# Patient Record
Sex: FEMALE | Race: WHITE | NOT HISPANIC OR LATINO | Employment: FULL TIME | URBAN - METROPOLITAN AREA
[De-identification: names, ages, dates, MRNs, and addresses within clinical notes are randomized per-mention and may not be internally consistent; named-entity substitution may affect disease eponyms.]

---

## 2019-07-20 ENCOUNTER — TRANSCRIBE ORDERS (OUTPATIENT)
Dept: ADMINISTRATIVE | Facility: HOSPITAL | Age: 79
End: 2019-07-20

## 2019-07-20 ENCOUNTER — LAB (OUTPATIENT)
Dept: LAB | Facility: HOSPITAL | Age: 79
End: 2019-07-20

## 2019-07-20 DIAGNOSIS — E03.9 MYXEDEMA HEART DISEASE: Primary | ICD-10-CM

## 2019-07-20 DIAGNOSIS — I51.9 MYXEDEMA HEART DISEASE: Primary | ICD-10-CM

## 2019-07-20 DIAGNOSIS — I51.9 MYXEDEMA HEART DISEASE: ICD-10-CM

## 2019-07-20 DIAGNOSIS — E03.9 MYXEDEMA HEART DISEASE: ICD-10-CM

## 2019-07-20 LAB
T4 FREE SERPL-MCNC: 1.21 NG/DL (ref 0.58–1.64)
TSH SERPL DL<=0.05 MIU/L-ACNC: 2.65 MIU/ML (ref 0.34–5.6)

## 2019-07-20 PROCEDURE — 36415 COLL VENOUS BLD VENIPUNCTURE: CPT

## 2019-07-20 PROCEDURE — 84439 ASSAY OF FREE THYROXINE: CPT

## 2019-07-20 PROCEDURE — 84443 ASSAY THYROID STIM HORMONE: CPT

## 2020-01-11 ENCOUNTER — LAB (OUTPATIENT)
Dept: LAB | Facility: HOSPITAL | Age: 80
End: 2020-01-11

## 2020-01-11 ENCOUNTER — TRANSCRIBE ORDERS (OUTPATIENT)
Dept: ADMINISTRATIVE | Facility: HOSPITAL | Age: 80
End: 2020-01-11

## 2020-01-11 DIAGNOSIS — R73.09 IMPAIRED GLUCOSE TOLERANCE TEST: ICD-10-CM

## 2020-01-11 DIAGNOSIS — R74.8 ACID PHOSPHATASE ELEVATED: ICD-10-CM

## 2020-01-11 DIAGNOSIS — E03.9 MYXEDEMA HEART DISEASE: ICD-10-CM

## 2020-01-11 DIAGNOSIS — I51.9 MYXEDEMA HEART DISEASE: ICD-10-CM

## 2020-01-11 DIAGNOSIS — I10 ESSENTIAL HYPERTENSION, MALIGNANT: ICD-10-CM

## 2020-01-11 DIAGNOSIS — E78.5 HYPERLIPIDEMIA, UNSPECIFIED HYPERLIPIDEMIA TYPE: Primary | ICD-10-CM

## 2020-01-11 DIAGNOSIS — E78.5 HYPERLIPIDEMIA, UNSPECIFIED HYPERLIPIDEMIA TYPE: ICD-10-CM

## 2020-01-11 LAB
ALBUMIN SERPL-MCNC: 3.8 G/DL (ref 3.5–5.2)
ALBUMIN/GLOB SERPL: 1.5 G/DL
ALP SERPL-CCNC: 94 U/L (ref 39–117)
ALT SERPL W P-5'-P-CCNC: 17 U/L (ref 1–33)
ANION GAP SERPL CALCULATED.3IONS-SCNC: 13.4 MMOL/L (ref 5–15)
AST SERPL-CCNC: 24 U/L (ref 1–32)
BILIRUB SERPL-MCNC: 1.3 MG/DL (ref 0.2–1.2)
BUN BLD-MCNC: 19 MG/DL (ref 8–23)
BUN/CREAT SERPL: 18.3 (ref 7–25)
CALCIUM SPEC-SCNC: 9.2 MG/DL (ref 8.6–10.5)
CHLORIDE SERPL-SCNC: 100 MMOL/L (ref 98–107)
CHOLEST SERPL-MCNC: 173 MG/DL (ref 0–200)
CO2 SERPL-SCNC: 24.6 MMOL/L (ref 22–29)
CREAT BLD-MCNC: 1.04 MG/DL (ref 0.57–1)
CRP SERPL-MCNC: 0.05 MG/DL (ref 0–0.5)
GFR SERPL CREATININE-BSD FRML MDRD: 51 ML/MIN/1.73
GLOBULIN UR ELPH-MCNC: 2.6 GM/DL
GLUCOSE BLD-MCNC: 88 MG/DL (ref 65–99)
HDLC SERPL-MCNC: 52 MG/DL (ref 40–60)
LDLC SERPL CALC-MCNC: 110 MG/DL (ref 0–100)
LDLC/HDLC SERPL: 2.12 {RATIO}
POTASSIUM BLD-SCNC: 4.3 MMOL/L (ref 3.5–5.2)
PROT SERPL-MCNC: 6.4 G/DL (ref 6–8.5)
SODIUM BLD-SCNC: 138 MMOL/L (ref 136–145)
TRIGL SERPL-MCNC: 55 MG/DL (ref 0–150)
TSH SERPL DL<=0.05 MIU/L-ACNC: 3.31 UIU/ML (ref 0.27–4.2)
VLDLC SERPL-MCNC: 11 MG/DL (ref 5–40)

## 2020-01-11 PROCEDURE — 80061 LIPID PANEL: CPT

## 2020-01-11 PROCEDURE — 36415 COLL VENOUS BLD VENIPUNCTURE: CPT

## 2020-01-11 PROCEDURE — 84443 ASSAY THYROID STIM HORMONE: CPT

## 2020-01-11 PROCEDURE — 86140 C-REACTIVE PROTEIN: CPT

## 2020-01-11 PROCEDURE — 83036 HEMOGLOBIN GLYCOSYLATED A1C: CPT

## 2020-01-11 PROCEDURE — 80053 COMPREHEN METABOLIC PANEL: CPT

## 2020-01-13 LAB — HBA1C MFR BLD: 5.1 % (ref 3.5–5.6)

## 2020-07-11 ENCOUNTER — LAB (OUTPATIENT)
Dept: LAB | Facility: HOSPITAL | Age: 80
End: 2020-07-11

## 2020-07-11 ENCOUNTER — TRANSCRIBE ORDERS (OUTPATIENT)
Dept: LAB | Facility: HOSPITAL | Age: 80
End: 2020-07-11

## 2020-07-11 DIAGNOSIS — I10 ESSENTIAL HYPERTENSION, MALIGNANT: ICD-10-CM

## 2020-07-11 DIAGNOSIS — E78.5 HYPERLIPIDEMIA, UNSPECIFIED HYPERLIPIDEMIA TYPE: ICD-10-CM

## 2020-07-11 DIAGNOSIS — E03.5 HYPOTHYROID COMA (HCC): ICD-10-CM

## 2020-07-11 DIAGNOSIS — E78.5 HYPERLIPIDEMIA, UNSPECIFIED HYPERLIPIDEMIA TYPE: Primary | ICD-10-CM

## 2020-07-11 LAB
ALBUMIN SERPL-MCNC: 3.8 G/DL (ref 3.5–5.2)
ALBUMIN/GLOB SERPL: 1.7 G/DL
ALP SERPL-CCNC: 84 U/L (ref 39–117)
ALT SERPL W P-5'-P-CCNC: 15 U/L (ref 1–33)
ANION GAP SERPL CALCULATED.3IONS-SCNC: 10.1 MMOL/L (ref 5–15)
AST SERPL-CCNC: 20 U/L (ref 1–32)
BILIRUB SERPL-MCNC: 1.8 MG/DL (ref 0–1.2)
BUN SERPL-MCNC: 15 MG/DL (ref 8–23)
BUN/CREAT SERPL: 13.5 (ref 7–25)
CALCIUM SPEC-SCNC: 9.9 MG/DL (ref 8.6–10.5)
CHLORIDE SERPL-SCNC: 102 MMOL/L (ref 98–107)
CHOLEST SERPL-MCNC: 164 MG/DL (ref 0–200)
CO2 SERPL-SCNC: 26.9 MMOL/L (ref 22–29)
CREAT SERPL-MCNC: 1.11 MG/DL (ref 0.57–1)
GFR SERPL CREATININE-BSD FRML MDRD: 47 ML/MIN/1.73
GLOBULIN UR ELPH-MCNC: 2.3 GM/DL
GLUCOSE SERPL-MCNC: 90 MG/DL (ref 65–99)
HDLC SERPL-MCNC: 56 MG/DL (ref 40–60)
LDLC SERPL CALC-MCNC: 99 MG/DL (ref 0–100)
LDLC/HDLC SERPL: 1.76 {RATIO}
POTASSIUM SERPL-SCNC: 4.4 MMOL/L (ref 3.5–5.2)
PROT SERPL-MCNC: 6.1 G/DL (ref 6–8.5)
SODIUM SERPL-SCNC: 139 MMOL/L (ref 136–145)
TRIGL SERPL-MCNC: 46 MG/DL (ref 0–150)
TSH SERPL DL<=0.05 MIU/L-ACNC: 4.83 UIU/ML (ref 0.27–4.2)
VLDLC SERPL-MCNC: 9.2 MG/DL (ref 5–40)

## 2020-07-11 PROCEDURE — 80053 COMPREHEN METABOLIC PANEL: CPT

## 2020-07-11 PROCEDURE — 80061 LIPID PANEL: CPT

## 2020-07-11 PROCEDURE — 36415 COLL VENOUS BLD VENIPUNCTURE: CPT

## 2020-07-11 PROCEDURE — 84443 ASSAY THYROID STIM HORMONE: CPT

## 2020-11-22 PROBLEM — I10 HYPERTENSION: Status: ACTIVE | Noted: 2018-10-28

## 2020-11-22 PROBLEM — E78.5 HYPERLIPIDEMIA: Status: ACTIVE | Noted: 2018-10-28

## 2020-11-22 PROBLEM — E03.9 HYPOTHYROIDISM: Status: ACTIVE | Noted: 2018-10-28

## 2020-11-22 PROBLEM — M26.69 TMJ DERANGEMENT: Status: ACTIVE | Noted: 2018-10-28

## 2020-12-07 ENCOUNTER — TRANSCRIBE ORDERS (OUTPATIENT)
Dept: ADMINISTRATIVE | Facility: HOSPITAL | Age: 80
End: 2020-12-07

## 2020-12-07 ENCOUNTER — LAB (OUTPATIENT)
Dept: LAB | Facility: HOSPITAL | Age: 80
End: 2020-12-07

## 2020-12-07 DIAGNOSIS — R53.83 TIREDNESS: Primary | ICD-10-CM

## 2020-12-07 DIAGNOSIS — R53.83 TIREDNESS: ICD-10-CM

## 2021-07-03 ENCOUNTER — TRANSCRIBE ORDERS (OUTPATIENT)
Dept: ADMINISTRATIVE | Facility: HOSPITAL | Age: 81
End: 2021-07-03

## 2021-07-03 ENCOUNTER — LAB (OUTPATIENT)
Dept: LAB | Facility: HOSPITAL | Age: 81
End: 2021-07-03

## 2021-07-03 DIAGNOSIS — R73.09 IMPAIRED GLUCOSE TOLERANCE TEST: ICD-10-CM

## 2021-07-03 DIAGNOSIS — I10 HYPERTENSION, UNSPECIFIED TYPE: ICD-10-CM

## 2021-07-03 DIAGNOSIS — E03.9 HYPOTHYROIDISM, ADULT: ICD-10-CM

## 2021-07-03 DIAGNOSIS — R53.83 TIREDNESS: ICD-10-CM

## 2021-07-03 DIAGNOSIS — R74.8 ACID PHOSPHATASE ELEVATED: ICD-10-CM

## 2021-07-03 DIAGNOSIS — R74.8 ACID PHOSPHATASE ELEVATED: Primary | ICD-10-CM

## 2021-07-03 DIAGNOSIS — E78.2 MIXED HYPERLIPIDEMIA: ICD-10-CM

## 2021-07-03 LAB
ALBUMIN SERPL-MCNC: 3.8 G/DL (ref 3.5–5.2)
ALBUMIN/GLOB SERPL: 1.6 G/DL
ALP SERPL-CCNC: 87 U/L (ref 39–117)
ALT SERPL W P-5'-P-CCNC: 15 U/L (ref 1–33)
ANION GAP SERPL CALCULATED.3IONS-SCNC: 9.2 MMOL/L (ref 5–15)
AST SERPL-CCNC: 22 U/L (ref 1–32)
BILIRUB SERPL-MCNC: 1.8 MG/DL (ref 0–1.2)
BUN SERPL-MCNC: 14 MG/DL (ref 8–23)
BUN/CREAT SERPL: 12.7 (ref 7–25)
CALCIUM SPEC-SCNC: 9.7 MG/DL (ref 8.6–10.5)
CHLORIDE SERPL-SCNC: 105 MMOL/L (ref 98–107)
CHOLEST SERPL-MCNC: 166 MG/DL (ref 0–200)
CO2 SERPL-SCNC: 27.8 MMOL/L (ref 22–29)
CREAT SERPL-MCNC: 1.1 MG/DL (ref 0.57–1)
CRP SERPL-MCNC: <0.3 MG/DL (ref 0–0.5)
GFR SERPL CREATININE-BSD FRML MDRD: 48 ML/MIN/1.73
GLOBULIN UR ELPH-MCNC: 2.4 GM/DL
GLUCOSE SERPL-MCNC: 86 MG/DL (ref 65–99)
HDLC SERPL-MCNC: 56 MG/DL (ref 40–60)
LDLC SERPL CALC-MCNC: 100 MG/DL (ref 0–100)
LDLC/HDLC SERPL: 1.79 {RATIO}
POTASSIUM SERPL-SCNC: 4.7 MMOL/L (ref 3.5–5.2)
PROT SERPL-MCNC: 6.2 G/DL (ref 6–8.5)
SODIUM SERPL-SCNC: 142 MMOL/L (ref 136–145)
TRIGL SERPL-MCNC: 49 MG/DL (ref 0–150)
TSH SERPL DL<=0.05 MIU/L-ACNC: 4.63 UIU/ML (ref 0.27–4.2)
VLDLC SERPL-MCNC: 10 MG/DL (ref 5–40)

## 2021-07-03 PROCEDURE — 86140 C-REACTIVE PROTEIN: CPT

## 2021-07-03 PROCEDURE — 80061 LIPID PANEL: CPT

## 2021-07-03 PROCEDURE — 36415 COLL VENOUS BLD VENIPUNCTURE: CPT

## 2021-07-03 PROCEDURE — 80053 COMPREHEN METABOLIC PANEL: CPT

## 2021-07-03 PROCEDURE — 83036 HEMOGLOBIN GLYCOSYLATED A1C: CPT

## 2021-07-03 PROCEDURE — 84443 ASSAY THYROID STIM HORMONE: CPT

## 2021-07-04 LAB — HBA1C MFR BLD: 5.4 % (ref 3.5–5.6)

## 2021-10-12 ENCOUNTER — LAB (OUTPATIENT)
Dept: LAB | Facility: HOSPITAL | Age: 81
End: 2021-10-12

## 2021-10-12 ENCOUNTER — TRANSCRIBE ORDERS (OUTPATIENT)
Dept: ADMINISTRATIVE | Facility: HOSPITAL | Age: 81
End: 2021-10-12

## 2021-10-12 DIAGNOSIS — E03.9 THYROID GLAND INSUFFICIENCY (ACQUIRED): Primary | ICD-10-CM

## 2021-10-12 DIAGNOSIS — E03.9 THYROID GLAND INSUFFICIENCY (ACQUIRED): ICD-10-CM

## 2021-10-12 PROCEDURE — 84443 ASSAY THYROID STIM HORMONE: CPT

## 2021-10-12 PROCEDURE — 36415 COLL VENOUS BLD VENIPUNCTURE: CPT

## 2021-10-13 LAB — TSH SERPL DL<=0.05 MIU/L-ACNC: 1.88 UIU/ML (ref 0.27–4.2)

## 2022-11-19 ENCOUNTER — APPOINTMENT (OUTPATIENT)
Dept: GENERAL RADIOLOGY | Facility: HOSPITAL | Age: 82
End: 2022-11-19

## 2022-11-19 ENCOUNTER — HOSPITAL ENCOUNTER (EMERGENCY)
Facility: HOSPITAL | Age: 82
Discharge: HOME | End: 2022-11-19
Attending: EMERGENCY MEDICINE | Admitting: EMERGENCY MEDICINE

## 2022-11-19 VITALS
TEMPERATURE: 98 F | HEIGHT: 65 IN | BODY MASS INDEX: 24.65 KG/M2 | OXYGEN SATURATION: 100 % | WEIGHT: 147.93 LBS | SYSTOLIC BLOOD PRESSURE: 145 MMHG | HEART RATE: 74 BPM | DIASTOLIC BLOOD PRESSURE: 67 MMHG | RESPIRATION RATE: 17 BRPM

## 2022-11-19 DIAGNOSIS — S52.592A OTHER CLOSED FRACTURE OF DISTAL END OF LEFT RADIUS, INITIAL ENCOUNTER: Primary | ICD-10-CM

## 2022-11-19 PROCEDURE — 73110 X-RAY EXAM OF WRIST: CPT

## 2022-11-19 PROCEDURE — 99282 EMERGENCY DEPT VISIT SF MDM: CPT

## 2022-11-19 NOTE — ED PROVIDER NOTES
"Subjective        Provider in Triage Note  81 y/o white female with PMHx of thyroid disease, HLD, and HTN presents to the ED with L wrist pain onset 3 days ago. Pt states she was checking the anti-freeze level in her car when she lost her  of the castañeda and it came down onto her L wrist. Pt reports pain/discomfort with moving of the wrist. Notes a pulling pain in L elbow and thumb/finger stiffness with movement. Pt denies taking anything for her pain but reports some temporary relief with ice. Pain is an 8 out of 10. Denies hitting her head, LOC, falling, or R finger/wrist/elbow pain at this time.       History of Present Illness    Review of Systems   Constitutional: Negative.    Skin: Negative for wound.   Neurological: Negative.        Past Medical History:   Diagnosis Date   • Disease of thyroid gland    • Hyperlipidemia    • Hypertension        No Known Allergies    No past surgical history on file.    No family history on file.    Social History     Socioeconomic History   • Marital status:    Tobacco Use   • Smoking status: Never   • Smokeless tobacco: Never   Substance and Sexual Activity   • Alcohol use: Never       Prior to Admission medications    Medication Sig Start Date End Date Taking? Authorizing Provider   atorvastatin (LIPITOR) 40 MG tablet ATORVASTATIN CALCIUM 40 MG TABS 10/28/18   Emergency, Nurse Valeria, RN   chlorthalidone (HYGROTON) 25 MG tablet  6/19/21   Emergency, Nurse Valeria, RN   levothyroxine (Synthroid) 50 MCG tablet SYNTHROID 50 MCG TABS 10/28/18   Emergency, Nurse Epic, RN   olmesartan (BENICAR) 20 MG tablet  5/17/21   Emergency, Nurse Epic, RN   olmesartan-hydrochlorothiazide (Benicar HCT) 40-25 MG per tablet BENICAR HCT 40-25 MG TABS 10/28/18   Emergency, Nurse Valeria, RN   traMADol (ULTRAM) 50 MG tablet 1 tablet p.o. every 6 hours 7/13/21   Bharat Browning MD     /66   Pulse 75   Temp 97.9 °F (36.6 °C) (Oral)   Resp 18   Ht 165.1 cm (65\")   Wt 67.1 kg (147 lb " 14.9 oz)   SpO2 100%   BMI 24.62 kg/m²   I examined the patient using the appropriate personal protective equipment.        Objective   Physical Exam  General: Well-appearing, no acute distress  Psych: Oriented, pleasant affect  Respirations: Clear, nonlabored respirations  Skin: No rash, normal color  Left upper extremity there is some mild soft tissue swelling over the volar aspect of the wrist, there is no snuffbox tenderness there is no bony deformity she has normal pulses at the wrist normal brisk cap refill distally normal sensorimotor function throughout the hand, there is no proximal forearm tenderness, no open wounds, no cellulitis  Procedures           ED Course               XR Wrist 3+ View Left    Result Date: 11/19/2022  Possible nondisplaced fracture along distal radius.  Electronically Signed By-Gordon Israel MD On:11/19/2022 11:00 AM This report was finalized on [id number] by  Gordon Israel MD.                                 MDM  Patient was advised the findings with possible fracture of the distal radius.  No apparent snuffbox tenderness to suggest scaphoid injury.  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  She was placed in wrist splint by the ER tech and will be discharged home to follow-up with orthopedic referral.  Patient voiced understanding plan and discharged in good condition.  She does have normal neurovascular function of the extremity intact and no other signs of injury  Final diagnoses:   Other closed fracture of distal end of left radius, initial encounter       ED Disposition  ED Disposition     ED Disposition   Discharge    Condition   Stable    Comment   --             Tony Gamboa MD  [address] [place] [address]  [phone]    Schedule an appointment as soon as possible for a visit in 2 days           Medication List      No changes were made to your prescriptions during this visit.          Solitario Faulkner,  MD  11/19/22 9164

## 2022-11-19 NOTE — DISCHARGE INSTRUCTIONS
Cool compress, elevation, splint.  Tylenol for discomfort.  Follow-up with orthopedics this week.  Return for increased pain, discoloration, numbness, weakness or any other concerns

## 2023-07-03 ENCOUNTER — TELEPHONE (OUTPATIENT)
Dept: CARDIOLOGY | Facility: CLINIC | Age: 83
End: 2023-07-03

## 2023-07-03 NOTE — TELEPHONE ENCOUNTER
Caller: Ines Browning    Relationship to patient: Self    Best call back number: [phone]     Type of visit: NEW PATIENT    If rescheduling, when is the original appointment: 09.01.2023    Additional notes: PT STATES THAT SHE STILL WORKS AND SHE TENDS TO WORK MOST AT END OF MONTH/FIRST OF MONTH AND WAS HOPING TO BE SEEN A LITTLE SOONER - PT ON [facility] AND SCHEDULED IN NEXT AVAILABLE SPOT WITH DR HURST PER PT REQUEST -

## 2023-08-24 ENCOUNTER — TELEPHONE (OUTPATIENT)
Dept: CARDIOLOGY | Facility: CLINIC | Age: 83
End: 2023-08-24

## 2023-08-24 NOTE — TELEPHONE ENCOUNTER
[facility] received a fax that requires your attention. The document has been indexed to the patient’s chart for your review.      Reason for sending: EXTERNAL MEDICAL RECORD NOTIFICATION     Documents Description: [employer] SHARING MEDICAL RECORDS, RECORDS INDEXED LISTED BELOW    Name of Sender: [employer] [facility]     Date Indexed: MULTIPLE, LIST BELOW     NOTES:   INDEXED THE FOLLOWING MEDICAL RECORDS:  PROGRESS NOTE 2.23.23  ECHOCARDIOGRAM 7.13.22  ECHOCARDIOGRAM 1.5.22  ECHO 7.7.21  MEDICATIONS 8.24.23  LAB 10.18.22  ECHOCARDIOGRAM 5.8.19  ECG (POSSIBLE ABNORMAL) 7.21.20  ECG (POSSIBLE ABNORMAL) 2.23.23

## 2023-10-09 NOTE — PROGRESS NOTES
Cardiology Office Consultation      Encounter Date:  10/11/2023    PATIENT IDENTIFICATION    Name: Ines Browning  Age: 83 y.o. Sex: female : 1940  MRN: [id number]    Reason For Consultation:  Valvular heart disease    History of Present Illness:  Dear Dr. Cao, Dulce Maria Yeung, JADE    I had the pleasure of seeing Ines Browning today. As you are well aware, this is a 83 y.o. female with no established history of ischemic heart disease.  She does have a history of hypertension, hyperlipidemia, antiplatelet therapy, hypothyroidism, and valvular heart disease.  She presents today for the evaluation of valvular heart disease.    She denies any chest pain pressure heaviness or tightness.  She denies any shortness of breath.  She denies any PND orthopnea.  She denies any syncope or near syncope.  She reports feeling quite well from a cardiac perspective.    She is a very active individual.  She still works 40 hours a week for [employer].  She still mows her own grass utilizing a riding lawn more and self-propelled mower at times.  She espouses no physical limitations.    She expresses some concern regarding her echocardiogram.  She had an echocardiogram performed in 2022.  This demonstrated moderate pulmonary hypertension, normal left ventricular systolic function, mild tricuspid and pulmonic insufficiencies, moderate mitral insufficiency and moderate to severe aortic insufficiency.    We had an extensive discussion regarding valvular heart disease, natural history, symptoms of significant valve failure, and modalities for therapy.  Currently, the patient is completely asymptomatic and I would recommend no interventional strategies at this time.  Routine surveillance with an echocardiogram every 3 to 5 years is warranted unless symptoms dictate otherwise.    Her blood pressure is elevated today but she keeps a very extensive blood pressure logs.  She logs her pressure twice daily.  Her  "blood pressure is almost exclusively below 140/90.    Assessment & Plan    Impressions:  Valvular heart disease     Moderate to severe aortic insufficiency, moderate mitral insufficiency, mild tricuspid/pulmonic insufficiency echocardiogram July 2022  Moderate pulmonary hypertension  Hypertension  Hypertensive cardiovascular disease  Mixed hyperlipidemia  Antiplatelet therapy    Recommendations:  Continuation of her current cardiovascular regimen at the present time.     This includes antihypertensives, antiplatelets, and statin therapy.  Periodic echocardiogram to evaluate insufficiency of valves.  Follow-up in 1 years time sooner should there be difficulties.      Diagnoses and all orders for this visit:    1. Nonrheumatic aortic valve insufficiency (Primary)  -     ECG 12 Lead    2. Nonrheumatic mitral valve regurgitation  -     ECG 12 Lead    3. Nonrheumatic tricuspid valve regurgitation  -     ECG 12 Lead    4. Nonrheumatic pulmonary valve insufficiency  -     ECG 12 Lead    5. Essential hypertension  -     ECG 12 Lead    6. Mixed hyperlipidemia  -     ECG 12 Lead    7. Long term (current) use of antithrombotics/antiplatelets  -     ECG 12 Lead         Objective:    Vitals:  Vitals:    10/11/23 1441   BP: 166/84   Pulse: 67   SpO2: 98%   Weight: 67.1 kg (148 lb)   Height: 165.1 cm (65\")     Body mass index is 24.63 kg/mý.    Physical Exam:    General: Alert, cooperative, no distress, appears stated age  Head:  Normocephalic, atraumatic, mucous membranes moist  Eyes:  Conjunctiva/corneas clear, EOM's intact     Neck:  Supple,  no bruit    Lungs: Clear to auscultation bilaterally, no wheezes rhonchi rales are noted  Chest wall: No tenderness  Heart::  Regular rate and rhythm, S1 and S2 normal, 1/6 holosystolic murmur.  No rub or gallop  Abdomen: Soft, non-tender, nondistended bowel sounds active  Extremities: No cyanosis, clubbing, or edema  Pulses: 2+ and symmetric all extremities  Skin:  No rashes or " lesions  Neuro/psych: A&O x3. CN II through XII are grossly intact with appropriate affect    History of Present Illness      Allergies:  No Known Allergies    Medication Review:     Current Outpatient Medications:     aspirin 81 MG EC tablet, Take 1 tablet by mouth Daily., Disp: , Rfl:     atorvastatin (LIPITOR) 40 MG tablet, ATORVASTATIN CALCIUM 40 MG TABS, Disp: , Rfl:     Boswellia-Glucosamine-Vit D (OSTEO BI-FLEX ONE PER DAY PO), Take  by mouth., Disp: , Rfl:     Calcium Carb-Cholecalciferol (Caltrate 600+D3) 600-20 MG-MCG tablet, Take  by mouth., Disp: , Rfl:     chlorthalidone (HYGROTON) 25 MG tablet, , Disp: , Rfl:     Cyanocobalamin (VITAMIN B 12 PO), Take  by mouth., Disp: , Rfl:     levothyroxine (Synthroid) 50 MCG tablet, 1.5 tablets., Disp: , Rfl:     olmesartan (BENICAR) 20 MG tablet, , Disp: , Rfl:     vitamin D3 125 MCG (5000 UT) capsule capsule, Take 1 capsule by mouth Daily., Disp: , Rfl:     Family History:  Family History   Problem Relation Age of Onset    Hypertension Mother     Heart valve disorder Mother        Past Medical History:  Past Medical History:   Diagnosis Date    Disease of thyroid gland     Heart valve disease     Hyperlipidemia     Hypertension        Past surgical History:  Past Surgical History:   Procedure Laterality Date    SHOULDER SURGERY         Social History:  Social History     Socioeconomic History    Marital status:    Tobacco Use    Smoking status: Never    Smokeless tobacco: Never   Vaping Use    Vaping Use: Never used   Substance and Sexual Activity    Alcohol use: Never    Drug use: Never    Sexual activity: Defer       Review of Systems:  The following systems were reviewed as they relate to the cardiovascular system: Constitutional, Eyes, ENT, Cardiovascular, Respiratory, Gastrointestinal, Integumentary, Neurological, Psychiatric, Hematologic, Endocrine, Musculoskeletal, and Genitourinary. The pertinent cardiovascular findings are reported above with all  "other cardiovascular points within those systems being negative.    Diagnostic Study Review:     Current Electrocardiogram:    ECG 12 Lead    Date/Time: 10/11/2023 5:15 PM  Performed by: Kody Chamberlain DO    Authorized by: Kody Chamberlain DO  Comparison: not compared with previous ECG   Previous ECG: no previous ECG available  Comments: Normal sinus rhythm with a ventricular rate of 67 bpm.  First-degree AV block.  Low voltage in the precordial leads.  Nonspecific repolarization changes.  Normal QT and QTc intervals.          Laboratory Data:  Lab Results   Component Value Date    GLUCOSE 86 07/03/2021    BUN 14 07/03/2021    CREATININE 1.10 (H) 07/03/2021    EGFRIFNONA 48 (L) 07/03/2021    BCR 12.7 07/03/2021    K 4.7 07/03/2021    CO2 27.8 07/03/2021    CALCIUM 9.7 07/03/2021    ALBUMIN 3.80 07/03/2021    AST 22 07/03/2021    ALT 15 07/03/2021     Lab Results   Component Value Date    GLUCOSE 86 07/03/2021    CALCIUM 9.7 07/03/2021     07/03/2021    K 4.7 07/03/2021    CO2 27.8 07/03/2021     07/03/2021    BUN 14 07/03/2021    CREATININE 1.10 (H) 07/03/2021    EGFRIFNONA 48 (L) 07/03/2021    BCR 12.7 07/03/2021    ANIONGAP 9.2 07/03/2021     No results found for: \"WBC\", \"HGB\", \"HCT\", \"MCV\", \"PLT\"  Lab Results   Component Value Date    CHOL 166 07/03/2021    TRIG 49 07/03/2021    HDL 56 07/03/2021     07/03/2021     Lab Results   Component Value Date    HGBA1C 5.4 07/03/2021     No results found for: \"INR\", \"PROTIME\"    Most Recent Echo:       Most Recent Stress Test:       Most Recent Cardiac Catheterization:   No results found for this or any previous visit.       NOTE: The following portions of the patient's note were reviewed, confirmed and/or updated this visit as appropriate: History of present illness/Interval history, physical examination, assessment & plan, allergies, current medications, past family history, past medical history, past social history, past " surgical history and problem list.

## 2023-10-11 ENCOUNTER — OFFICE VISIT (OUTPATIENT)
Dept: CARDIOLOGY | Facility: CLINIC | Age: 83
End: 2023-10-11
Payer: MEDICARE

## 2023-10-11 VITALS
HEIGHT: 65 IN | OXYGEN SATURATION: 98 % | DIASTOLIC BLOOD PRESSURE: 84 MMHG | HEART RATE: 67 BPM | WEIGHT: 148 LBS | BODY MASS INDEX: 24.66 KG/M2 | SYSTOLIC BLOOD PRESSURE: 166 MMHG

## 2023-10-11 DIAGNOSIS — I36.1 NONRHEUMATIC TRICUSPID VALVE REGURGITATION: ICD-10-CM

## 2023-10-11 DIAGNOSIS — I10 ESSENTIAL HYPERTENSION: ICD-10-CM

## 2023-10-11 DIAGNOSIS — I35.1 NONRHEUMATIC AORTIC VALVE INSUFFICIENCY: Primary | ICD-10-CM

## 2023-10-11 DIAGNOSIS — I34.0 NONRHEUMATIC MITRAL VALVE REGURGITATION: ICD-10-CM

## 2023-10-11 DIAGNOSIS — I37.1 NONRHEUMATIC PULMONARY VALVE INSUFFICIENCY: ICD-10-CM

## 2023-10-11 DIAGNOSIS — E78.2 MIXED HYPERLIPIDEMIA: ICD-10-CM

## 2023-10-11 DIAGNOSIS — Z79.02 LONG TERM (CURRENT) USE OF ANTITHROMBOTICS/ANTIPLATELETS: ICD-10-CM

## 2023-10-11 RX ORDER — ASPIRIN 81 MG/1
81 TABLET ORAL DAILY
COMMUNITY

## 2023-10-11 RX ORDER — GINGER ROOT/GINGER ROOT EXT 262.5 MG
CAPSULE ORAL
COMMUNITY

## 2023-11-17 ENCOUNTER — LAB (OUTPATIENT)
Dept: LAB | Facility: HOSPITAL | Age: 83
End: 2023-11-17
Payer: MEDICARE

## 2023-11-17 ENCOUNTER — TRANSCRIBE ORDERS (OUTPATIENT)
Dept: ADMINISTRATIVE | Facility: HOSPITAL | Age: 83
End: 2023-11-17
Payer: MEDICARE

## 2023-11-17 DIAGNOSIS — E78.5 HYPERLIPIDEMIA, UNSPECIFIED HYPERLIPIDEMIA TYPE: Primary | ICD-10-CM

## 2023-11-17 DIAGNOSIS — R74.8 ACID PHOSPHATASE ELEVATED: ICD-10-CM

## 2023-11-17 DIAGNOSIS — E78.5 HYPERLIPIDEMIA, UNSPECIFIED HYPERLIPIDEMIA TYPE: ICD-10-CM

## 2023-11-17 DIAGNOSIS — E03.9 PRIMARY HYPOTHYROIDISM: ICD-10-CM

## 2023-11-17 DIAGNOSIS — R53.83 TIREDNESS: ICD-10-CM

## 2023-11-17 DIAGNOSIS — I10 ESSENTIAL HYPERTENSION, MALIGNANT: ICD-10-CM

## 2023-11-17 LAB
ALBUMIN SERPL-MCNC: 3.8 G/DL (ref 3.5–5.2)
ALBUMIN/GLOB SERPL: 1.2 G/DL
ALP SERPL-CCNC: 90 U/L (ref 39–117)
ALT SERPL W P-5'-P-CCNC: 11 U/L (ref 1–33)
ANION GAP SERPL CALCULATED.3IONS-SCNC: 11 MMOL/L (ref 5–15)
AST SERPL-CCNC: 20 U/L (ref 1–32)
BILIRUB SERPL-MCNC: 1.6 MG/DL (ref 0–1.2)
BUN SERPL-MCNC: 13 MG/DL (ref 8–23)
BUN/CREAT SERPL: 12.1 (ref 7–25)
BURR CELLS BLD QL SMEAR: ABNORMAL
CALCIUM SPEC-SCNC: 10 MG/DL (ref 8.6–10.5)
CHLORIDE SERPL-SCNC: 102 MMOL/L (ref 98–107)
CHOLEST SERPL-MCNC: 178 MG/DL (ref 0–200)
CO2 SERPL-SCNC: 26 MMOL/L (ref 22–29)
CREAT SERPL-MCNC: 1.07 MG/DL (ref 0.57–1)
CRP SERPL-MCNC: 0.32 MG/DL (ref 0.01–0.5)
DEPRECATED RDW RBC AUTO: 40.3 FL (ref 37–54)
EGFRCR SERPLBLD CKD-EPI 2021: 51.6 ML/MIN/1.73
ERYTHROCYTE [DISTWIDTH] IN BLOOD BY AUTOMATED COUNT: 12.2 % (ref 12.3–15.4)
GLOBULIN UR ELPH-MCNC: 3.2 GM/DL
GLUCOSE SERPL-MCNC: 110 MG/DL (ref 65–99)
HCT VFR BLD AUTO: 42 % (ref 34–46.6)
HDLC SERPL-MCNC: 55 MG/DL (ref 40–60)
HGB BLD-MCNC: 14.3 G/DL (ref 12–15.9)
LDLC SERPL CALC-MCNC: 113 MG/DL (ref 0–100)
LDLC/HDLC SERPL: 2.04 {RATIO}
LYMPHOCYTES # BLD MANUAL: 1.58 10*3/MM3 (ref 0.7–3.1)
LYMPHOCYTES NFR BLD MANUAL: 3 % (ref 5–12)
MCH RBC QN AUTO: 30.5 PG (ref 26.6–33)
MCHC RBC AUTO-ENTMCNC: 33.9 G/DL (ref 31.5–35.7)
MCV RBC AUTO: 89.9 FL (ref 79–97)
METAMYELOCYTES NFR BLD MANUAL: 4 % (ref 0–0)
MONOCYTES # BLD: 0.2 10*3/MM3 (ref 0.1–0.9)
NEUTROPHILS # BLD AUTO: 4.55 10*3/MM3 (ref 1.7–7)
NEUTROPHILS NFR BLD MANUAL: 61 % (ref 42.7–76)
NEUTS BAND NFR BLD MANUAL: 8 % (ref 0–5)
PLAT MORPH BLD: NORMAL
PLATELET # BLD AUTO: 259 10*3/MM3 (ref 140–450)
PMV BLD AUTO: 7.1 FL (ref 6–12)
POTASSIUM SERPL-SCNC: 4.7 MMOL/L (ref 3.5–5.2)
PROT SERPL-MCNC: 7 G/DL (ref 6–8.5)
RBC # BLD AUTO: 4.68 10*6/MM3 (ref 3.77–5.28)
SCAN SLIDE: NORMAL
SODIUM SERPL-SCNC: 139 MMOL/L (ref 136–145)
TRIGL SERPL-MCNC: 53 MG/DL (ref 0–150)
TSH SERPL DL<=0.05 MIU/L-ACNC: 4.3 UIU/ML (ref 0.27–4.2)
VARIANT LYMPHS NFR BLD MANUAL: 2 % (ref 0–5)
VARIANT LYMPHS NFR BLD MANUAL: 22 % (ref 19.6–45.3)
VLDLC SERPL-MCNC: 10 MG/DL (ref 5–40)
WBC MORPH BLD: NORMAL
WBC NRBC COR # BLD AUTO: 6.6 10*3/MM3 (ref 3.4–10.8)

## 2023-11-17 PROCEDURE — 85025 COMPLETE CBC W/AUTO DIFF WBC: CPT

## 2023-11-17 PROCEDURE — 80061 LIPID PANEL: CPT

## 2023-11-17 PROCEDURE — 86141 C-REACTIVE PROTEIN HS: CPT

## 2023-11-17 PROCEDURE — 80053 COMPREHEN METABOLIC PANEL: CPT

## 2023-11-17 PROCEDURE — 85007 BL SMEAR W/DIFF WBC COUNT: CPT

## 2023-11-17 PROCEDURE — 84443 ASSAY THYROID STIM HORMONE: CPT

## 2023-11-17 PROCEDURE — 36415 COLL VENOUS BLD VENIPUNCTURE: CPT

## 2025-07-04 ENCOUNTER — APPOINTMENT (OUTPATIENT)
Dept: GENERAL RADIOLOGY | Facility: HOSPITAL | Age: 85
End: 2025-07-04
Payer: MEDICARE

## 2025-07-04 ENCOUNTER — HOSPITAL ENCOUNTER (EMERGENCY)
Facility: HOSPITAL | Age: 85
Discharge: HOME | End: 2025-07-04
Payer: MEDICARE

## 2025-07-04 VITALS
TEMPERATURE: 97.9 F | WEIGHT: 143.96 LBS | BODY MASS INDEX: 23.99 KG/M2 | HEIGHT: 65 IN | DIASTOLIC BLOOD PRESSURE: 58 MMHG | HEART RATE: 57 BPM | RESPIRATION RATE: 17 BRPM | OXYGEN SATURATION: 98 % | SYSTOLIC BLOOD PRESSURE: 114 MMHG

## 2025-07-04 DIAGNOSIS — S61.209A AVULSION OF SKIN OF FINGER, INITIAL ENCOUNTER: Primary | ICD-10-CM

## 2025-07-04 PROCEDURE — 99283 EMERGENCY DEPT VISIT LOW MDM: CPT

## 2025-07-04 PROCEDURE — 90471 IMMUNIZATION ADMIN: CPT | Performed by: NURSE PRACTITIONER

## 2025-07-04 PROCEDURE — 73140 X-RAY EXAM OF FINGER(S): CPT

## 2025-07-04 PROCEDURE — 90715 TDAP VACCINE 7 YRS/> IM: CPT | Performed by: NURSE PRACTITIONER

## 2025-07-04 PROCEDURE — 25010000002 TETANUS-DIPHTH-ACELL PERTUSSIS 5-2.5-18.5 LF-MCG/0.5 SUSPENSION PREFILLED SYRINGE: Performed by: NURSE PRACTITIONER

## 2025-07-04 RX ADMIN — GELATIN ABSORBABLE SPONGE 12-7 MM: 12-7 MISC at 12:25

## 2025-07-04 RX ADMIN — TETANUS TOXOID, REDUCED DIPHTHERIA TOXOID AND ACELLULAR PERTUSSIS VACCINE, ADSORBED 0.5 ML: 5; 2.5; 8; 8; 2.5 SUSPENSION INTRAMUSCULAR at 11:55

## 2025-07-04 NOTE — ED PROVIDER NOTES
Subjective   History of Present Illness  Patient is an 84-year-old white female who presents today with complaints of an injury to her left fifth finger.  She states she was trying to move a ladder when it pinched her left fifth finger.  She complains of pain as well as a wound to the palmar aspect of the left fifth finger.  Unsure of her last tetanus booster.      Review of Systems   Skin:  Positive for wound.        Left 5th finger wound       Past Medical History:   Diagnosis Date    Disease of thyroid gland     Heart valve disease     Hyperlipidemia     Hypertension        No Known Allergies    Past Surgical History:   Procedure Laterality Date    SHOULDER SURGERY         Family History   Problem Relation Age of Onset    Hypertension Mother     Heart valve disorder Mother        Social History     Socioeconomic History    Marital status:    Tobacco Use    Smoking status: Never    Smokeless tobacco: Never   Vaping Use    Vaping status: Never Used   Substance and Sexual Activity    Alcohol use: Never    Drug use: Never    Sexual activity: Defer           Objective   Physical Exam  Vital signs and triage nurse note reviewed.  Constitutional: Awake, alert; well-developed and well-nourished. No acute distress is noted.  Musculoskeletal: Independent range of motion of all extremities with no palpable tenderness or edema.  Skin: Flesh tone, warm, dry.  There is skin avulsion noted to the palmar aspect of the left fifth finger with a tiny skin flap of skin.  Scant active bleeding controlled with direct pressure.  There is no nail involvement.  There is good cap refill and sensation distally.    Nerve Block    Date/Time: 7/4/2025 12:00 PM    Performed by: Alexandra Campbell APRN  Authorized by: Alexandra Campbell APRN    Consent:     Consent obtained:  Verbal    Consent given by:  Patient    Risks, benefits, and alternatives were discussed: yes      Risks discussed:  Infection, swelling, pain, unsuccessful block and  bleeding  Universal protocol:     Immediately prior to procedure, a time out was called: yes      Patient identity confirmed:  Verbally with patient and arm band  Indications:     Indications:  Pain relief  Location:     Body area:  Upper extremity    Upper extremity nerve blocked: fifth finger.    Laterality:  Left  Pre-procedure details:     Skin preparation:  Alcohol  Procedure details:     Block needle gauge:  30 G    Anesthetic injected:  Lidocaine 1% w/o epi    Injection procedure:  Anatomic landmarks identified, incremental injection, negative aspiration for blood, introduced needle and anatomic landmarks palpated  Post-procedure details:     Outcome:  Anesthesia achieved    Procedure completion:  Tolerated well, no immediate complications             ED Course        Labs Reviewed - No data to display  XR Finger 2+ View Left  Result Date: 7/4/2025  Impression: No definite acute osseous abnormality of the fifth finger. Electronically Signed: Hiral Resendez MD  7/4/2025 12:13 PM EDT  Workstation ID: [id number]    Medications   Tetanus-Diphth-Acell Pertussis (BOOSTRIX) injection 0.5 mL (0.5 mL Intramuscular Given 7/4/25 4596)   gelatin absorbable (GELFOAM) sponge ( Apply externally Given by Other 7/4/25 1225)                                                    Medical Decision Making  Patient presents today with the above complaint.    She had above exam and evaluation.  X-ray was obtained.  She was given a tetanus booster.  Digital block of the left finger was performed as noted above.  The thin flap of skin was removed with scissors.  Gelfoam and dressing was applied to the wound.    Workup: X-ray of the left fifth finger was independently interpreted by Dr. Jarrett reviewed by myself and shows no fracture, radiologist reports no definite acute osseous abnormality of the fifth finger.    Findings were discussed with the patient.  She will be discharged home instructed follow-up with her primary care provider as  needed but was given return precautions and she voiced understanding.    Problems Addressed:  Avulsion of skin of finger, initial encounter: complicated acute illness or injury    Amount and/or Complexity of Data Reviewed  Radiology: ordered.    Risk  Prescription drug management.        Final diagnoses:   Avulsion of skin of finger, initial encounter       ED Disposition  ED Disposition       ED Disposition   Discharge    Condition   Stable    Comment   --               Dulce Maria Cao, APRN  [address] [address]  [phone]      As needed         Medication List      No changes were made to your prescriptions during this visit.            Alexandra Campbell, APRN  07/04/25 8093

## 2025-07-04 NOTE — DISCHARGE INSTRUCTIONS
Leave dressing in place for 48 hours then clean daily with soap and water.  Watch for signs of infection.  Follow-up with your primary care provider as needed.  Return to the ED for new or worsening symptoms.